# Patient Record
Sex: MALE | Race: WHITE | Employment: STUDENT | URBAN - METROPOLITAN AREA
[De-identification: names, ages, dates, MRNs, and addresses within clinical notes are randomized per-mention and may not be internally consistent; named-entity substitution may affect disease eponyms.]

---
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
## 2019-04-12 ENCOUNTER — PROCEDURE VISIT (OUTPATIENT)
Dept: SPORTS MEDICINE | Age: 19
End: 2019-04-12

## 2019-04-12 DIAGNOSIS — M23.200 OTHER OLD TEAR OF LATERAL MENISCUS OF RIGHT KNEE: Primary | ICD-10-CM

## 2019-04-12 ASSESSMENT — PAIN SCALES - GENERAL: PAINLEVEL_OUTOF10: 7

## 2019-05-02 ENCOUNTER — OFFICE VISIT (OUTPATIENT)
Dept: ORTHOPEDIC SURGERY | Age: 19
End: 2019-05-02
Payer: COMMERCIAL

## 2019-05-02 VITALS — WEIGHT: 315 LBS | BODY MASS INDEX: 42.66 KG/M2 | HEIGHT: 72 IN

## 2019-05-02 DIAGNOSIS — M25.561 RIGHT KNEE PAIN, UNSPECIFIED CHRONICITY: Primary | ICD-10-CM

## 2019-05-02 DIAGNOSIS — M25.461 EFFUSION OF RIGHT KNEE: ICD-10-CM

## 2019-05-02 PROCEDURE — 99202 OFFICE O/P NEW SF 15 MIN: CPT | Performed by: PHYSICIAN ASSISTANT

## 2019-05-02 ASSESSMENT — ENCOUNTER SYMPTOMS
ALLERGIC/IMMUNOLOGIC NEGATIVE: 1
GASTROINTESTINAL NEGATIVE: 1
RESPIRATORY NEGATIVE: 1
EYES NEGATIVE: 1

## 2019-05-02 NOTE — PROGRESS NOTES
Review of Systems   Constitutional: Negative. HENT: Negative. Eyes: Negative. Respiratory: Negative. Cardiovascular: Negative. Gastrointestinal: Negative. Endocrine: Negative. Genitourinary: Negative. Musculoskeletal: Negative. Skin: Negative. Allergic/Immunologic: Negative. Neurological: Negative. Hematological: Negative.     Psychiatric/Behavioral:        Depression or other problems

## 2019-05-03 NOTE — PROGRESS NOTES
locations you wish to document?: No    Review of Systems:  A 14 point review of systems was completed by the patient on 5/3/2019 and is available in the media section of the scanned medical record and was reviewed on 5/3/2019. The review is negative with the exception of those things mentioned in the HPI and Past Medical History. Past History:  Past Medical History:   Diagnosis Date    Migraines      Past Surgical History:   Procedure Laterality Date    KNEE ARTHROSCOPY      RIGHT KNEE    KNEE ARTHROSCOPY Right 02/27/2017    : RIGHT KNEE ARTHROSCOPY, LATERAL MENISECTOMY,CHONDROPLASTY    OTHER SURGICAL HISTORY      mole removal for scalp     Current Outpatient Medications on File Prior to Visit   Medication Sig Dispense Refill    aspirin EC 81 MG EC tablet Take 1 tablet by mouth 2 times daily (with meals) for 21 days 42 tablet 0     No current facility-administered medications on file prior to visit.       Social History     Socioeconomic History    Marital status: Single     Spouse name: Not on file    Number of children: Not on file    Years of education: Not on file    Highest education level: Not on file   Occupational History    Not on file   Social Needs    Financial resource strain: Not on file    Food insecurity:     Worry: Not on file     Inability: Not on file    Transportation needs:     Medical: Not on file     Non-medical: Not on file   Tobacco Use    Smoking status: Never Smoker    Smokeless tobacco: Never Used   Substance and Sexual Activity    Alcohol use: No    Drug use: No    Sexual activity: Not on file   Lifestyle    Physical activity:     Days per week: Not on file     Minutes per session: Not on file    Stress: Not on file   Relationships    Social connections:     Talks on phone: Not on file     Gets together: Not on file     Attends Jehovah's witness service: Not on file     Active member of club or organization: Not on file     Attends meetings of clubs or organizations: Not PAIN:       [x] NL(4) [] Mild(3) [] Mod(2) [] Sev(1)   [x] NL(4) [] Mild(3) [] Mod(2) [] Sev(1)    Points (12)  R:      L:      Patello Femoral Joint:             RIGHT      LEFT  CREPITUS:                 [] NL(4) [x] Mild(3) [] Mod(2) [] Sev(1)   [x] NL(4) [] Mild(3) [] Mod(2) [] Sev(1)    COMP PAIN:               [x] NL(4) [] Mild(3) [] Mod(2) [] Sev(1)   [x] NL(4) [] Mild(3) [] Mod(2) [] Sev(1)    SOFT TISSUE PAIN:  [x] NL(4) []Mild(3) []Mod(2) [] Sev(1)  Location:    [x] NL(4) [] Mild(3) [] Mod(2) [] Sev(1)   Location:    SOFT TISSUE SWELLING:                                      [x]NL(4) []Mild(3) []Mod(2) [] Sev(1)   Location:     [x] NL(4) [] Mild(3) [] Mod(2) [] Sev(1)  Location:     Points: (16)  R:      L:         Patello Femoral Alignment:            RIGHT      LEFT  LAT. SUBLUX at 20 Degrees (%width)                                            [x]0-25 (4) []26-50 (3) []51-75 (2) [] >75 (1)    [x]0-25 (4) []26-50 (3) []51-75 (2) [] >75 (1)   MED.  SUBLUX at 20 Degrees (mm)                                            [x]15 (4) []11-15 (3) []6-10 (2) [] 0-5 (1)    [x]15 (4) []11-15 (3) []6-10 (2) [] 0-5 (1)   Q Angle at 5 Degrees                                            [x]0-15 (4) []16-20 (3) []21-25 (2) [] >25 (1)   [x]0-15 (4) []16-20 (3) []21-25 (2) [] >25 (1)   Q Angle Max at 20 Degrees                                            [x]25 (4) []30 (3) []35 (2) [] >35 (1)   [x]25 (4) []30 (3) []35 (2) [] >35 (1)   Points: (16)  R:      L:    Ligament Test:   Test   Right Left  Difference Test  Right Left Difference   Ant 25 Degrees [x] Normal       mm      mm      mm Med 0 Degrees [x] Normal       mm      mm      mm   Ant 90 Degrees [x] Normal       mm      mm      mm Med 25 Degrees [x] Normal       mm      mm      mm   P.S. (0-3) [x] Normal         Lat 0 Degrees [x] Normal       mm      mm      mm   Post 25 Degrees [x] Normal       mm      mm       mm Lat 25 Degrees [x] Normal       mm      mm      mm deadlifts and leg presses put a great deal of stress on the knee and create the kinds of conditions that would cause a tear in the meniscus. For this reason I believe the patient has a medical necessity to obtain an MRI with T2 degeneration of the right knee to evaluate for a repeat lateral meniscus tear. Patient was educated on conservative management that he should undertake for this condition. This includes diet, light weight/low impact exercise, ice, rest, NSAIDs, and activity modification. The patient should avoid any deep squatting, lunging, leg presses, deadlifts, or any activity that causes his right knee pain. The patient should follow up in 1 week to review the MRI results of his right knee. Patient was understanding of all instructions and agreeable to this plan. Treatment Plan:    1. Obtain an MRI with T2 degeneration of the right knee  2. Ice, rest, NSAIDs as needed  3. Activity modification, avoiding deep squatting and lunging  4. Follow-up in 1 week        5/3/19  12:48 PM        [id number] Ney Siddiqui PA-C  Physician Assistant, Orthopaedic Surgery      This dictation was performed with a verbal recognition program [facility]) and it was checked for errors. It is possible that there are still dictated errors within this office note. If so, please bring any errors to my attention for an addendum. All efforts were made to ensure that this office note is accurate.

## 2019-05-06 DIAGNOSIS — S83.281A TEAR OF LATERAL MENISCUS OF RIGHT KNEE, UNSPECIFIED TEAR TYPE, UNSPECIFIED WHETHER OLD OR CURRENT TEAR, INITIAL ENCOUNTER: Primary | ICD-10-CM

## 2019-07-12 ENCOUNTER — TELEPHONE (OUTPATIENT)
Dept: ORTHOPEDIC SURGERY | Age: 19
End: 2019-07-12

## 2020-01-27 ENCOUNTER — OFFICE VISIT (OUTPATIENT)
Dept: ORTHOPEDIC SURGERY | Age: 20
End: 2020-01-27
Payer: COMMERCIAL

## 2020-01-27 VITALS
BODY MASS INDEX: 42.66 KG/M2 | DIASTOLIC BLOOD PRESSURE: 66 MMHG | HEIGHT: 72 IN | WEIGHT: 315 LBS | SYSTOLIC BLOOD PRESSURE: 125 MMHG | HEART RATE: 67 BPM

## 2020-01-27 PROBLEM — M25.561 RIGHT KNEE PAIN: Status: ACTIVE | Noted: 2017-06-06

## 2020-01-27 PROCEDURE — 99213 OFFICE O/P EST LOW 20 MIN: CPT | Performed by: PHYSICIAN ASSISTANT

## 2020-01-27 RX ORDER — RIZATRIPTAN BENZOATE 10 MG/1
10 TABLET ORAL PRN
COMMUNITY
Start: 2018-01-30

## 2020-01-28 NOTE — PROGRESS NOTES
is a healthy and well appearing 20y.o. year old male who is sitting comfortably in our office in no acute distress. Neuro: Alert & Oriented x 3,  normal,  no focal deficits noted. Normal mood & affect. Eyes: Sclera clear  Ears: Normal external ear  Mouth:  No perioral lesions  Pulm: Respirations unlabored and regular   Skin: Warm, well perfused      Knee Examination: Right    Inspection: Large from prior arthroscopy. No effusion, ecchymosis, discoloration, erythema, excessive warmth. no gross deformities, no signs of infection. Palpation: There is mild patellofemoral crepitus, there is mild lateral joint line tenderness. No other osseous or soft tissue tenderness around the knee. Negative calf tenderness    Range of Motion: -10 - 120 degrees. Patient has noted hyperextension with the heel raising 10 cm off the table    Strength:  5/5 quadriceps, 5/5 hamstrings    Special Tests:  No ligament instability, valgus and varus stress test are stable at 0 and 30°. Lachman's exhibits a solid endpoint. Negative posterior drawer. Negative Homans sign    Gait: Steady, Non antalgic, without the use of assistive devices    Alignment: Mild valgus deformity    Neuro: Sensation equal bilateral lower extremities    Vascular: 2+ posterior tibialis pulse      Contralateral Knee Comparison Examination: Left    Inspection:   No effusion, ecchymosis, discoloration, erythema, excessive warmth. no gross deformities, no signs of infection. Palpation: There is mild patellofemoral crepitus, there is no joint line tenderness. No other osseous or soft tissue tenderness around the knee. Negative calf tenderness    Range of Motion: -8 -145 degrees. Patient has noted hyperextension with the heel raising 10 cm off the table    Strength:  5/5 quadriceps, 5/5 hamstrings    Special Tests:  No ligament instability, valgus and varus stress test are stable at 0 and 30°. Lachman's exhibits a solid endpoint. Negative posterior drawer. Negative Homans sign    Gait:  Steady, Non antalgic, without the use of assistive devices    Alignment: Mild valgus deformity    Neuro: Sensation equal bilateral lower extremities    Vascular: 2+ posterior tibialis pulse      Radiology: MRI of the right shoulder as well as the report from the study conducted on 07/30/2019 reviewed. Impression: Generative changes to the lateral meniscus. Para meniscal cyst noted with no definitive tears noted. Arthritic spurs noted to the lateral tibiofemoral compartment. See MRI read in the media section from 01/27/2020 for more details. Assessment: Patient is a 20 y.o. male presenting to the office for a follow-up evaluation of his right knee as well as review of his MRI results. Encounter Diagnoses   Name Primary?  Right knee pain, unspecified chronicity Yes    S/P right knee arthroscopy          Medical decision making:  Patient's workup and evaluation were reviewed with the patient in the office today. MRI imaging was thoroughly reviewed with the patient the office today. Given the patient's history and physical exam I believe his symptoms are stemming from early degenerative changes within the right knee most notably wearing in the lateral tibiofemoral compartment. Patient was educated on conservative therapy that he should undertake for his condition as detailed the treatment plan below. All the patient's questions were answered while in the clinic. The patient is understanding of all instructions and agrees with the plan. Patient does not smoke and did not require smoking cessation patient education. Treatment Plan:    1. Activity modification, avoid squats and lunges  2. Ice 20 minutes ever 1-2 hours PRN  3. NSAIDs as needed  4. Rest   5. Elevation at least 2 inches above the heart with pillows supporting the joint underneath  6. Lightweight exercise/low impact exercise  7.  Appropriate diet/weight management      Follow up in: 3 ozzie Woods PA-C    Physician Assistant - Certified  [facility] and [address] [place] [address]    01/28/20 2:18 PM      This dictation was performed with a verbal recognition program (DRAGON) and it was checked for errors. It is possible that there are still dictated errors within this office note. If so, please bring any errors to my attention for an addendum. All efforts were made to ensure that this office note is accurate.

## 2022-01-20 ENCOUNTER — OFFICE VISIT (OUTPATIENT)
Dept: ORTHOPEDIC SURGERY | Age: 22
End: 2022-01-20
Payer: COMMERCIAL

## 2022-01-20 VITALS — HEIGHT: 72 IN | WEIGHT: 310 LBS | BODY MASS INDEX: 41.99 KG/M2

## 2022-01-20 DIAGNOSIS — M25.561 RIGHT KNEE PAIN, UNSPECIFIED CHRONICITY: ICD-10-CM

## 2022-01-20 DIAGNOSIS — M23.261 DERANGEMENT OF OTHER LATERAL MENISCUS DUE TO OLD TEAR OR INJURY, RIGHT KNEE: ICD-10-CM

## 2022-01-20 DIAGNOSIS — M17.31 POST-TRAUMATIC OSTEOARTHRITIS OF RIGHT KNEE: Primary | ICD-10-CM

## 2022-01-20 PROCEDURE — 99203 OFFICE O/P NEW LOW 30 MIN: CPT | Performed by: PHYSICIAN ASSISTANT

## 2022-01-20 ASSESSMENT — ENCOUNTER SYMPTOMS
EYES NEGATIVE: 1
RESPIRATORY NEGATIVE: 1
GASTROINTESTINAL NEGATIVE: 1
ALLERGIC/IMMUNOLOGIC NEGATIVE: 1

## 2022-01-21 NOTE — PROGRESS NOTES
12 [facility]  History and Physical      Date:  2022    Name:  Nathen Stewart  Address:  [address] [place] [address]    :  2000      Age:   24 y.o. Chief Complaint    Follow-up (R knee)      History of Present Illness:  Nathen Stewart is a 24 y.o. male who presents for evaluation of his acute on chronic right knee pain. Patient states that he was at a [employer] approximate 5 days ago. When he was leaving the game he was ambulating down the stairs when he felt a sharp pain in his right knee. He localizes the pain to the lateral posterior lateral joint line. Due to the pain in his right knee buckled and he fell down. He was able to catch himself and did not fully fall to the ground. Since then he has been experiencing intermittent pain in the anterior lateral joint line. He describes the pain as sharp on occasion with certain movements mostly twisting turning. After experiencing the sharp pain he experiences persistent achiness for several hours afterwards. He denies any mechanical symptoms or sensations of giving way since. He is attempted conservative therapy including: Rest, ice, activity modification, over-the-counter pain medications and a brace. The patient denies any catching, joint locking, numbness, paresthesias, or weakness. History from last visit on 2020:  Nathen Stewart is a 20 y.o. male who presents for evaluation of his right knee as well as review of his MRI results. To recap, the patient presented on 2019 with persistent right knee pain. He denied any mechanism of injury that he can associate with the pain. Due to the patient having persistent pain as well as mechanical symptoms an MRI was ordered with a suspected lateral meniscus tear. The patient is here today to review these results. Patient continues to note pain in the lateral joint line of the right knee.   He states this has mildly decreased as he continues conservative therapy including: rest, ice, elevation, bracing, home exercises, activity modification, and NSAIDs as needed. Patient notes he is attempting to decrease his weight with diet and exercise and is already had some mild success. The patient denies any new injury. The patient denies any catching, giving way, joint locking, numbness, paresthesias, or weakness.       Pain Assessment  Location of Pain: Knee  Location Modifiers: Right  Severity of Pain: 5  Quality of Pain: Aching  Duration of Pain: Persistent  Frequency of Pain: Constant  Limiting Behavior: Some  Relieving Factors: Rest  Result of Injury: Yes  Work-Related Injury: No  Are there other pain locations you wish to document?: No    Past Medical History:   Diagnosis Date    Migraines         Past Surgical History:   Procedure Laterality Date    KNEE ARTHROSCOPY      RIGHT KNEE    KNEE ARTHROSCOPY Right 02/27/2017    : RIGHT KNEE ARTHROSCOPY, LATERAL MENISECTOMY,CHONDROPLASTY    OTHER SURGICAL HISTORY      mole removal for scalp       Family History   Problem Relation Age of Onset    Diabetes Mother     High Blood Pressure Mother     High Cholesterol Mother     High Blood Pressure Father        Social History     Socioeconomic History    Marital status: Single     Spouse name: None    Number of children: None    Years of education: None    Highest education level: None   Occupational History    None   Tobacco Use    Smoking status: Never Smoker    Smokeless tobacco: Never Used   Substance and Sexual Activity    Alcohol use: No    Drug use: No    Sexual activity: None   Other Topics Concern    None   Social History Narrative    None     Social Determinants of Health     Financial Resource Strain:     Difficulty of Paying Living Expenses: Not on file   Food Insecurity:     Worried About Running Out of Food in the Last Year: Not on file    Mason of Food in the Last Year: Not on file   Transportation Needs:     Lack of Transportation (Medical): Not on file    Lack of Transportation (Non-Medical): Not on file   Physical Activity:     Days of Exercise per Week: Not on file    Minutes of Exercise per Session: Not on file   Stress:     Feeling of Stress : Not on file   Social Connections:     Frequency of Communication with Friends and Family: Not on file    Frequency of Social Gatherings with Friends and Family: Not on file    Attends Hinduism Services: Not on file    Active Member of [address] [place] [address] [employer] or Organizations: Not on file    Attends Club or Organization Meetings: Not on file    Marital Status: Not on file   Intimate Partner Violence:     Fear of Current or Ex-Partner: Not on file    Emotionally Abused: Not on file    Physically Abused: Not on file    Sexually Abused: Not on file   Housing Stability:     Unable to Pay for Housing in the Last Year: Not on file    Number of [place] in the Last Year: Not on file    Unstable Housing in the Last Year: Not on file       Current Outpatient Medications   Medication Sig Dispense Refill    rizatriptan (MAXALT) 10 MG tablet Take 10 mg by mouth as needed      aspirin EC 81 MG EC tablet Take 1 tablet by mouth 2 times daily (with meals) for 21 days 42 tablet 0     No current facility-administered medications for this visit. No Known Allergies      Review of Systems:  A 14 point review of systems was completed by the patient and is available in the media section of the scanned medical record and was reviewed. Vital Signs:   Ht 6' (1.829 m)   Wt (!) 310 lb (140.6 kg)   BMI 42.04 kg/m²     General Exam:    General: Kristin Moran is a healthy and well appearing 24y.o. year old male who is sitting comfortably in our office in no acute distress. Neuro: Alert & Oriented x 3,  normal,  no focal deficits noted. Normal mood & affect.   Eyes: Sclera clear  Ears: Normal external ear  Mouth: Patient wearing a mask  Pulm: Respirations unlabored and regular   Skin: Warm, well perfused    Knee Examination: Right     Inspection:  Well-healed scar from prior surgery. No effusion, ecchymosis, discoloration, erythema, excessive warmth. no gross deformities, no signs of infection.      Palpation: There is mild patellofemoral crepitus, there is mild lateral and posterolateral joint line tenderness. No other osseous or soft tissue tenderness around the knee. Negative calf tenderness     Range of Motion: -10 - 125 degrees. Patient has noted hyperextension with the heel raising 10 cm off the table     Strength:  5/5 quadriceps, 5/5 hamstrings     Special Tests:  No ligament instability, valgus and varus stress test are stable at 0 and 30°. Lachman's exhibits a solid endpoint. Negative posterior drawer. Negative Homans sign     Gait: Mild antalgic, without the use of assistive devices     Alignment: Mild valgus deformity     Neuro: Sensation equal bilateral lower extremities     Vascular: 2+ posterior tibialis pulse        Radiology:   Previously obtain imaging reviewed. X-rays obtained and reviewed in office: AP and merchant view of both knees and a lateral of the right. Impression: No fractures, dislocations, visible tumors, or signs of acute trauma. Patient exhibits decreased joint space in the medial and lateral femoral-tibial compartments bilaterally. Patient has decreased joint spacing in the patellofemoral joints bilaterally. Old MRI of the right knee as well as the report from the study conducted on 07/30/2019 reviewed. Impression: Generative changes to the lateral meniscus. Para meniscal cyst noted with no definitive tears noted. Arthritic spurs noted to the lateral tibiofemoral compartment. See MRI read in the media section from 01/27/2020 for more details.         Office Procedures:  Orders Placed This Encounter   Procedures    XR KNEE RIGHT (3 VIEWS)     Standing Status:   Future     Number of Occurrences:   1     Standing Expiration Date:   1/20/2023 Order Specific Question:   Reason for exam:     Answer:   pain            Assessment: Patient is a 24 y.o. male presenting to the office for an evaluation of his acute on chronic right knee pain. Patient is working diagnosis of an exacerbation of pre-existing posttraumatic osteoarthritis and a possible lateral meniscus tear. Encounter Diagnoses   Name Primary?  Post-traumatic osteoarthritis of right knee Yes    Right knee pain, unspecified chronicity     Derangement of other lateral meniscus due to old tear or injury, right knee          Medical decision making:  Patient's workup and evaluation were reviewed with the patient in the office today. Imaging reviewed with the patient. Given that the injury is recent and the patient is not exhibiting mechanical symptoms or giving way episodes daily, I believe the patient should attempt a trial conservative therapy. Should the patient's symptoms get worse or not improve over the next 4 to 6 weeks we'll obtain an MRI to evaluate for a lateral meniscus tear. He did exhibit some degenerative changes in the lateral meniscus on previous MRI imaging and this new episode may have worsened his condition. Patient was educated on conservative therapy for his condition as detailed the treatment plan below. He has physical therapy exercises that he can conduct at home and wasn't instructed to conduct home exercise program.     All the patient's questions were answered while in the clinic. The patient is understanding of all instructions and agrees with the plan. Treatment Plan:    1. Home exercise program  2. Work restrictions  3. Activity modification/Rest   4. Ice 20 minutes ever 1-2 hours PRN  5. Take medications as prescribed/instructed  6. Elevation   7. Lightweight exercise/low impact exercise  8.  Appropriate diet/weight management      Follow up: 6 weeks and as needed    This patient exhibits moderate complexity for obtaining an independent history, reviewing past medical records, independent interpretation/review of diagnostic imaging, and further coordinating care. The patient exhibits low risk given that the patient is being treated with conservative therapy. Approximately 30 minutes were spent reviewing past medical records, imaging, educating the patient, and coordinating care. Britney Obregon PA-C    Physician Assistant - Certified    05/10/66 3:27 PM        This dictation was performed with a verbal recognition program (DRAGON) and it was checked for errors. It is possible that there are still dictated errors within this office note. If so, please bring any errors to my attention for an addendum. All efforts were made to ensure that this office note is accurate.